# Patient Record
Sex: FEMALE | Race: WHITE | NOT HISPANIC OR LATINO | Employment: OTHER | URBAN - METROPOLITAN AREA
[De-identification: names, ages, dates, MRNs, and addresses within clinical notes are randomized per-mention and may not be internally consistent; named-entity substitution may affect disease eponyms.]

---

## 2022-02-19 PROBLEM — Z80.3 FAMILY HISTORY OF BREAST CANCER: Status: ACTIVE | Noted: 2022-02-19

## 2022-02-19 PROBLEM — Z80.0 FAMILY HISTORY OF COLON CANCER: Status: ACTIVE | Noted: 2022-02-19

## 2022-02-19 PROBLEM — E04.1 THYROID NODULE: Status: ACTIVE | Noted: 2022-02-19

## 2022-02-21 NOTE — PROGRESS NOTES
Assessment/Plan:    Encounter for gynecological examination (general) (routine) without abnormal findings  Doing well, recovering from right knee replacement  No breast or gyn complaints  Normal breast exam, mammo order given  Normal pelvic exam s/p hysterectomy  Last colonoscopy 2013    Macrobid rx sent for UTI that typically occurs in August     Thyroid nodule  Exam WNL, ultrasound follow up order given  Diagnoses and all orders for this visit:    Encounter for gynecological examination (general) (routine) without abnormal findings    Encounter for screening mammogram for malignant neoplasm of breast  -     Mammo screening bilateral w 3d & cad; Future    Dysuria  -     nitrofurantoin (MACROBID) 100 mg capsule; Take 1 capsule (100 mg total) by mouth 2 (two) times a day    Thyroid nodule  -     US head neck soft tissue; Future    Other orders  -     levothyroxine 50 mcg tablet; Take 50 mcg by mouth daily  -     Cobalamin Combinations (B-12) 100-5000 MCG SUBL; Place under the tongue  -     Ascorbic Acid (vitamin C) 1000 MG tablet; Take 1,000 mg by mouth daily  -     Cholecalciferol (Vitamin D) 125 MCG (5000 UT) CAPS; Take by mouth          Subjective:      Patient ID: Tere Robertson is a 79 y o  female  Here for Medicare biannual breast and pelvic exams  The following portions of the patient's history were reviewed and updated as appropriate:   She  has a past medical history of Colon polyps, Difficult airway, Diverticulosis, Fibrocystic breast, Hammer toe, Hypothyroid, Lo neuroma, Osteoarthritis, and Thyroid nodule (03/2021)    She   Patient Active Problem List    Diagnosis Date Noted    Encounter for gynecological examination (general) (routine) without abnormal findings 02/22/2022    Family history of breast cancer - Mother @32 02/19/2022    Family history of colon cancer - Mother @82 02/19/2022    Thyroid nodule 02/19/2022    History of resection of large bowel 2012    History of hysterectomy 1970     She  has a past surgical history that includes Mammo (historical) (11/19/2020); Colonoscopy (2013); DXA procedure(historical) (2015); Hysterectomy (1970); Appendectomy (1980); Cholecystectomy (1980); ARTHROSCOPY KNEE (2009); Bowel resection (2012); Tonsillectomy; Carpal tunnel release (2007); Rotator cuff repair (Left, 2004); Mohs surgery (2000); Knee cartilage surgery (Bilateral); Foot surgery (04/2021); and Knee surgery (11/2021)  Her family history is not on file  She  reports that she has never smoked  She has never used smokeless tobacco  She reports that she does not drink alcohol and does not use drugs  Current Outpatient Medications   Medication Sig Dispense Refill    Ascorbic Acid (vitamin C) 1000 MG tablet Take 1,000 mg by mouth daily      Cholecalciferol (Vitamin D) 125 MCG (5000 UT) CAPS Take by mouth      Cobalamin Combinations (B-12) 100-5000 MCG SUBL Place under the tongue      levothyroxine 50 mcg tablet Take 50 mcg by mouth daily      nitrofurantoin (MACROBID) 100 mg capsule Take 1 capsule (100 mg total) by mouth 2 (two) times a day 14 capsule 0     No current facility-administered medications for this visit  She is allergic to epinephrine, iodinated diagnostic agents, oxycodone, sulfa antibiotics, and other       Review of Systems  No breast, bladder, bowel changes  No new persistent pain, bloating, early satiety or pelvic pressure    Objective:      /54 (BP Location: Left arm, Patient Position: Sitting, Cuff Size: Standard)   Ht 5' 2 75" (1 594 m)   Wt 78 kg (172 lb)   BMI 30 71 kg/m²          Physical Exam  General appearance: no distress, pleasant  Neck: thyroid without nodules or thyromegaly, slightly prominent on right, no palpable adenopathy  Lymph nodes: no palpable adenopathy  Breasts: no masses, nodes or skin changes   Dense bilateral bases  Abdomen: soft, non tender, no palpable masses  Pelvic exam: normal atrophic external genitalia, urethral meatus normal, vagina atrophic without lesions, cuff intact, no adnexal masses, non tender  Rectal exam: deferred per request due to hemorrhoids

## 2022-02-22 ENCOUNTER — ANNUAL EXAM (OUTPATIENT)
Dept: OBGYN CLINIC | Facility: CLINIC | Age: 82
End: 2022-02-22
Payer: MEDICARE

## 2022-02-22 VITALS
HEIGHT: 63 IN | BODY MASS INDEX: 30.48 KG/M2 | WEIGHT: 172 LBS | DIASTOLIC BLOOD PRESSURE: 54 MMHG | SYSTOLIC BLOOD PRESSURE: 100 MMHG

## 2022-02-22 DIAGNOSIS — E04.1 THYROID NODULE: ICD-10-CM

## 2022-02-22 DIAGNOSIS — Z12.31 ENCOUNTER FOR SCREENING MAMMOGRAM FOR MALIGNANT NEOPLASM OF BREAST: ICD-10-CM

## 2022-02-22 DIAGNOSIS — R30.0 DYSURIA: ICD-10-CM

## 2022-02-22 DIAGNOSIS — Z01.419 ENCOUNTER FOR GYNECOLOGICAL EXAMINATION (GENERAL) (ROUTINE) WITHOUT ABNORMAL FINDINGS: Primary | ICD-10-CM

## 2022-02-22 PROCEDURE — G0101 CA SCREEN;PELVIC/BREAST EXAM: HCPCS | Performed by: OBSTETRICS & GYNECOLOGY

## 2022-02-22 RX ORDER — LEVOTHYROXINE SODIUM 0.05 MG/1
50 TABLET ORAL DAILY
COMMUNITY

## 2022-02-22 RX ORDER — NITROFURANTOIN 25; 75 MG/1; MG/1
100 CAPSULE ORAL 2 TIMES DAILY
Qty: 14 CAPSULE | Refills: 0 | Status: SHIPPED | OUTPATIENT
Start: 2022-02-22

## 2022-02-22 RX ORDER — AMOXICILLIN 250 MG
CAPSULE ORAL
COMMUNITY

## 2022-02-22 RX ORDER — CHOLECALCIFEROL (VITAMIN D3) 125 MCG
CAPSULE ORAL
COMMUNITY

## 2022-02-22 RX ORDER — MULTIVIT WITH MINERALS/LUTEIN
1000 TABLET ORAL DAILY
COMMUNITY

## 2022-02-22 NOTE — ASSESSMENT & PLAN NOTE
Doing well, recovering from right knee replacement  No breast or gyn complaints  Normal breast exam, mammo order given  Normal pelvic exam s/p hysterectomy     Last colonoscopy 2013    Macrobid rx sent for UTI that typically occurs in August

## 2022-02-22 NOTE — LETTER
February 22, 2022     Maliha Medeiros MD  [id number] [facility] [address]    Patient: Gianluca Mooney   YOB: 1940   Date of Visit: 2/22/2022       Dear Dr Ivone Rosales: Thank you for referring Gianluca Mooney to me for evaluation  Below are my notes for this consultation  If you have questions, please do not hesitate to call me  I look forward to following your patient along with you  Sincerely,        Scott Chau MD        CC: No Recipients  Scott Chau MD  2/22/2022 11:32 AM  Sign when Signing Visit  Assessment/Plan:    Encounter for gynecological examination (general) (routine) without abnormal findings  Doing well, recovering from right knee replacement  No breast or gyn complaints  Normal breast exam, mammo order given  Normal pelvic exam s/p hysterectomy  Last colonoscopy 2013    Macrobid rx sent for UTI that typically occurs in August     Thyroid nodule  Exam WNL, ultrasound follow up order given  Diagnoses and all orders for this visit:    Encounter for gynecological examination (general) (routine) without abnormal findings    Encounter for screening mammogram for malignant neoplasm of breast  -     Mammo screening bilateral w 3d & cad; Future    Dysuria  -     nitrofurantoin (MACROBID) 100 mg capsule; Take 1 capsule (100 mg total) by mouth 2 (two) times a day    Thyroid nodule  -     US head neck soft tissue; Future    Other orders  -     levothyroxine 50 mcg tablet; Take 50 mcg by mouth daily  -     Cobalamin Combinations (B-12) 100-5000 MCG SUBL; Place under the tongue  -     Ascorbic Acid (vitamin C) 1000 MG tablet; Take 1,000 mg by mouth daily  -     Cholecalciferol (Vitamin D) 125 MCG (5000 UT) CAPS; Take by mouth          Subjective:      Patient ID: Gianluca Mooney is a 79 y o  female  Here for Medicare biannual breast and pelvic exams         The following portions of the patient's history were reviewed and updated as appropriate:   She  has a past medical history of Colon polyps, Difficult airway, Diverticulosis, Fibrocystic breast, Hammer toe, Hypothyroid, Lo neuroma, Osteoarthritis, and Thyroid nodule (03/2021)  She   Patient Active Problem List    Diagnosis Date Noted    Encounter for gynecological examination (general) (routine) without abnormal findings 02/22/2022    Family history of breast cancer - Mother @78 02/19/2022    Family history of colon cancer - Mother @82 02/19/2022    Thyroid nodule 02/19/2022    History of resection of large bowel 2012    History of hysterectomy 1970     She  has a past surgical history that includes Mammo (historical) (11/19/2020); Colonoscopy (2013); DXA procedure(historical) (2015); Hysterectomy (1970); Appendectomy (1980); Cholecystectomy (1980); ARTHROSCOPY KNEE (2009); Bowel resection (2012); Tonsillectomy; Carpal tunnel release (2007); Rotator cuff repair (Left, 2004); Mohs surgery (2000); Knee cartilage surgery (Bilateral); Foot surgery (04/2021); and Knee surgery (11/2021)  Her family history is not on file  She  reports that she has never smoked  She has never used smokeless tobacco  She reports that she does not drink alcohol and does not use drugs  Current Outpatient Medications   Medication Sig Dispense Refill    Ascorbic Acid (vitamin C) 1000 MG tablet Take 1,000 mg by mouth daily      Cholecalciferol (Vitamin D) 125 MCG (5000 UT) CAPS Take by mouth      Cobalamin Combinations (B-12) 100-5000 MCG SUBL Place under the tongue      levothyroxine 50 mcg tablet Take 50 mcg by mouth daily      nitrofurantoin (MACROBID) 100 mg capsule Take 1 capsule (100 mg total) by mouth 2 (two) times a day 14 capsule 0     No current facility-administered medications for this visit  She is allergic to epinephrine, iodinated diagnostic agents, oxycodone, sulfa antibiotics, and other       Review of Systems  No breast, bladder, bowel changes   No new persistent pain, bloating, early satiety or pelvic pressure    Objective:      /54 (BP Location: Left arm, Patient Position: Sitting, Cuff Size: Standard)   Ht 5' 2 75" (1 594 m)   Wt 78 kg (172 lb)   BMI 30 71 kg/m²          Physical Exam  General appearance: no distress, pleasant  Neck: thyroid without nodules or thyromegaly, slightly prominent on right, no palpable adenopathy  Lymph nodes: no palpable adenopathy  Breasts: no masses, nodes or skin changes   Dense bilateral bases  Abdomen: soft, non tender, no palpable masses  Pelvic exam: normal atrophic external genitalia, urethral meatus normal, vagina atrophic without lesions, cuff intact, no adnexal masses, non tender  Rectal exam: deferred per request due to hemorrhoids

## 2022-08-22 ENCOUNTER — TELEPHONE (OUTPATIENT)
Dept: OBGYN CLINIC | Facility: CLINIC | Age: 82
End: 2022-08-22

## 2022-08-22 NOTE — TELEPHONE ENCOUNTER
Spoke with Jason Reddy regarding her U/S results of there thyroid  Went over results, and recommendation for u/s in a year  Jason Reddy aware and agrees with plan of care

## 2022-08-22 NOTE — TELEPHONE ENCOUNTER
Please inform of thyroid u/s results with two small nodules, not suspicious  The left nodule was 6 mm, now 9 mm  Recommend repeating the u/s in one year    Thanks

## 2022-12-13 DIAGNOSIS — Z12.31 ENCOUNTER FOR SCREENING MAMMOGRAM FOR MALIGNANT NEOPLASM OF BREAST: ICD-10-CM

## 2022-12-16 ENCOUNTER — TELEPHONE (OUTPATIENT)
Dept: OBGYN CLINIC | Facility: CLINIC | Age: 82
End: 2022-12-16

## 2022-12-16 NOTE — TELEPHONE ENCOUNTER
Pt called for mammogram results  Pt informed of normal imaging as noted on 12/13/22 by Dr Weiss Gins

## 2023-02-22 NOTE — PROGRESS NOTES
Assessment/Plan:    Family history of colon cancer - Mother @80  Notes some constipation with hemorrhoid/fissure  H/o bowel resection, has been screening with cologuard testing with PCP, four years ago per pt  Orders given for repeat cologuard  Thyroid nodule  Reviewed 2022 imaging with stable, 9 mm low risk nodules  Reviewed recs to not repeat imaging, requests follow up one more year  Normal thyroid exam, u/s orders given  Family history of breast cancer - Mother @80  No change in self or clinical breast exams  Mammo order given, last 11/21/22  Normal pelvic exam    Dexa orders given, last 2015 WNL  Will contact with results  Diagnoses and all orders for this visit:    Family history of breast cancer - Mother @80    History of hysterectomy    Thyroid nodule  -     US head neck soft tissue; Future    Colon cancer screening  -     Cologuard    Osteoporosis screening  -     DXA bone density spine hip and pelvis; Future    Asymptomatic menopausal state  -     DXA bone density spine hip and pelvis; Future    Encounter for screening mammogram for malignant neoplasm of breast  -     Mammo screening bilateral w 3d & cad; Future    Family history of colon cancer - Mother @80          Subjective:      Patient ID: Valeri Goltz is a 79 y o  female  HPI Here for annual check  Concerned re: thyroid nodules    The following portions of the patient's history were reviewed and updated as appropriate:   She  has a past medical history of Colon polyps, Difficult airway, Diverticulosis, Fibrocystic breast, Hammer toe, Hypothyroid, Lo neuroma, Osteoarthritis, and Thyroid nodule (03/2021)    She   Patient Active Problem List    Diagnosis Date Noted   • Encounter for gynecological examination (general) (routine) without abnormal findings 02/22/2022   • Family history of breast cancer - Mother @82 02/19/2022   • Family history of colon cancer - Mother @82 02/19/2022   • Thyroid nodule 02/19/2022   • History of resection of large bowel 2012   • History of hysterectomy 1970     She  has a past surgical history that includes Mammo (historical) (11/19/2020); Colonoscopy (2013); DXA procedure(historical) (2015); Hysterectomy (1970); Appendectomy (1980); Cholecystectomy (1980); ARTHROSCOPY KNEE (2009); Bowel resection (2012); Tonsillectomy; Carpal tunnel release (2007); Rotator cuff repair (Left, 2004); Mohs surgery (2000); Knee cartilage surgery (Bilateral); Foot surgery (04/2021); and Knee surgery (11/2021)  Her family history is not on file  She  reports that she has never smoked  She has never used smokeless tobacco  She reports that she does not currently use alcohol  She reports that she does not use drugs  Current Outpatient Medications   Medication Sig Dispense Refill   • Ascorbic Acid (vitamin C) 1000 MG tablet Take 1,000 mg by mouth daily     • Cholecalciferol (Vitamin D) 125 MCG (5000 UT) CAPS Take by mouth     • Cobalamin Combinations (B-12) 100-5000 MCG SUBL Place under the tongue     • levothyroxine 50 mcg tablet Take 50 mcg by mouth daily     • nitrofurantoin (MACROBID) 100 mg capsule Take 1 capsule (100 mg total) by mouth 2 (two) times a day 14 capsule 0     No current facility-administered medications for this visit  She is allergic to epinephrine, iodinated contrast media, oxycodone, sulfa antibiotics, and other       Review of Systems  No PMB, breast, bladder, bowel changes  No new persistent pain, bloating, early satiety or pelvic pressure      Objective:      /68 (BP Location: Left arm, Patient Position: Sitting, Cuff Size: Standard)   Ht 5' 4" (1 626 m)   Wt 78 7 kg (173 lb 6 4 oz)   Breastfeeding No   BMI 29 76 kg/m²          Physical Exam    General appearance: no distress, pleasant  Neck: thyroid without nodules or thyromegaly, slightly prominent on right, no palpable adenopathy  Lymph nodes: no palpable adenopathy  Breasts: no masses, nodes or skin changes   Dense bilateral bases  Abdomen: soft, non tender, no palpable masses  Pelvic exam: normal atrophic external genitalia, urethral meatus normal, vagina atrophic without lesions, cuff intact, no adnexal masses, non tender  Rectal exam: deferred per request due to hemorrhoids

## 2023-02-24 ENCOUNTER — OFFICE VISIT (OUTPATIENT)
Dept: OBGYN CLINIC | Facility: CLINIC | Age: 83
End: 2023-02-24

## 2023-02-24 VITALS
HEIGHT: 64 IN | BODY MASS INDEX: 29.6 KG/M2 | DIASTOLIC BLOOD PRESSURE: 68 MMHG | WEIGHT: 173.4 LBS | SYSTOLIC BLOOD PRESSURE: 118 MMHG

## 2023-02-24 DIAGNOSIS — Z90.710 HISTORY OF HYSTERECTOMY: ICD-10-CM

## 2023-02-24 DIAGNOSIS — Z80.3 FAMILY HISTORY OF BREAST CANCER: Primary | ICD-10-CM

## 2023-02-24 DIAGNOSIS — Z12.11 COLON CANCER SCREENING: ICD-10-CM

## 2023-02-24 DIAGNOSIS — Z80.0 FAMILY HISTORY OF COLON CANCER: ICD-10-CM

## 2023-02-24 DIAGNOSIS — Z12.31 ENCOUNTER FOR SCREENING MAMMOGRAM FOR MALIGNANT NEOPLASM OF BREAST: ICD-10-CM

## 2023-02-24 DIAGNOSIS — Z78.0 ASYMPTOMATIC MENOPAUSAL STATE: ICD-10-CM

## 2023-02-24 DIAGNOSIS — E04.1 THYROID NODULE: ICD-10-CM

## 2023-02-24 DIAGNOSIS — Z13.820 OSTEOPOROSIS SCREENING: ICD-10-CM

## 2023-02-24 NOTE — LETTER
February 24, 2023     GERALDINE Altman 79  [facility]    Patient: Gurpreet Anderson   YOB: 1940   Date of Visit: 2/24/2023       Dear Dr Wes Chung: Thank you for referring Gurpreet Anderson to me for evaluation  Below are my notes for this consultation  If you have questions, please do not hesitate to call me  I look forward to following your patient along with you  Sincerely,        Hannah Bourgeois MD        CC: No Recipients  Hannah Bourgeois MD  2/24/2023 11:29 AM  Sign when Signing Visit  Assessment/Plan:    Family history of colon cancer - Mother @80  Notes some constipation with hemorrhoid/fissure  H/o bowel resection, has been screening with cologuard testing with PCP, four years ago per pt  Orders given for repeat cologuard  Thyroid nodule  Reviewed 2022 imaging with stable, 9 mm low risk nodules  Reviewed recs to not repeat imaging, requests follow up one more year  Normal thyroid exam, u/s orders given  Family history of breast cancer - Mother @80  No change in self or clinical breast exams  Mammo order given, last 11/21/22  Normal pelvic exam    Dexa orders given, last 2015 WNL  Will contact with results  Diagnoses and all orders for this visit:    Family history of breast cancer - Mother @80    History of hysterectomy    Thyroid nodule  -     US head neck soft tissue; Future    Colon cancer screening  -     Cologuard    Osteoporosis screening  -     DXA bone density spine hip and pelvis; Future    Asymptomatic menopausal state  -     DXA bone density spine hip and pelvis; Future    Encounter for screening mammogram for malignant neoplasm of breast  -     Mammo screening bilateral w 3d & cad; Future    Family history of colon cancer - Mother @80         Subjective:     Patient ID: Gurpreet Anderson is a 79 y o  female  HPI Here for annual check   Concerned re: thyroid nodules    The following portions of the patient's history were reviewed and updated as appropriate:   She  has a past medical history of Colon polyps, Difficult airway, Diverticulosis, Fibrocystic breast, Hammer toe, Hypothyroid, Lo neuroma, Osteoarthritis, and Thyroid nodule (03/2021)  She   Patient Active Problem List    Diagnosis Date Noted   • Encounter for gynecological examination (general) (routine) without abnormal findings 02/22/2022   • Family history of breast cancer - Mother @82 02/19/2022   • Family history of colon cancer - Mother @82 02/19/2022   • Thyroid nodule 02/19/2022   • History of resection of large bowel 2012   • History of hysterectomy 1970     She  has a past surgical history that includes Mammo (historical) (11/19/2020); Colonoscopy (2013); DXA procedure(historical) (2015); Hysterectomy (1970); Appendectomy (1980); Cholecystectomy (1980); ARTHROSCOPY KNEE (2009); Bowel resection (2012); Tonsillectomy; Carpal tunnel release (2007); Rotator cuff repair (Left, 2004); Mohs surgery (2000); Knee cartilage surgery (Bilateral); Foot surgery (04/2021); and Knee surgery (11/2021)  Her family history is not on file  She  reports that she has never smoked  She has never used smokeless tobacco  She reports that she does not currently use alcohol  She reports that she does not use drugs  Current Outpatient Medications   Medication Sig Dispense Refill   • Ascorbic Acid (vitamin C) 1000 MG tablet Take 1,000 mg by mouth daily     • Cholecalciferol (Vitamin D) 125 MCG (5000 UT) CAPS Take by mouth     • Cobalamin Combinations (B-12) 100-5000 MCG SUBL Place under the tongue     • levothyroxine 50 mcg tablet Take 50 mcg by mouth daily     • nitrofurantoin (MACROBID) 100 mg capsule Take 1 capsule (100 mg total) by mouth 2 (two) times a day 14 capsule 0     No current facility-administered medications for this visit  She is allergic to epinephrine, iodinated contrast media, oxycodone, sulfa antibiotics, and other       Review of Systems No PMB, breast, bladder, bowel changes   No new persistent pain, bloating, early satiety or pelvic pressure      Objective:      /68 (BP Location: Left arm, Patient Position: Sitting, Cuff Size: Standard)   Ht 5' 4" (1 626 m)   Wt 78 7 kg (173 lb 6 4 oz)   Breastfeeding No   BMI 29 76 kg/m²         Physical Exam   General appearance: no distress, pleasant  Neck: thyroid without nodules or thyromegaly, slightly prominent on right, no palpable adenopathy  Lymph nodes: no palpable adenopathy  Breasts: no masses, nodes or skin changes   Dense bilateral bases  Abdomen: soft, non tender, no palpable masses  Pelvic exam: normal atrophic external genitalia, urethral meatus normal, vagina atrophic without lesions, cuff intact, no adnexal masses, non tender  Rectal exam: deferred per request due to hemorrhoids

## 2023-02-24 NOTE — PATIENT INSTRUCTIONS
Return to office in one year unless having any problems such as breast changes, bleeding, new persistent pain, new progressive bloating, new problems eating (getting full to quickly) or new constant urinary pressure that does not resolve in one week  Call in six months to schedule your annual visit  Please call the office if you do not hear about your results in 10-14 days

## 2023-02-24 NOTE — ASSESSMENT & PLAN NOTE
Reviewed 2022 imaging with stable, 9 mm low risk nodules  Reviewed recs to not repeat imaging, requests follow up one more year  Normal thyroid exam, u/s orders given

## 2023-02-24 NOTE — ASSESSMENT & PLAN NOTE
Notes some constipation with hemorrhoid/fissure  H/o bowel resection, has been screening with cologuard testing with PCP, four years ago per pt  Orders given for repeat cologuard

## 2023-02-24 NOTE — ASSESSMENT & PLAN NOTE
No change in self or clinical breast exams  Mammo order given, last 11/21/22  Normal pelvic exam    Dexa orders given, last 2015 WNL  Will contact with results

## 2023-10-19 ENCOUNTER — TELEPHONE (OUTPATIENT)
Dept: OBGYN CLINIC | Facility: CLINIC | Age: 83
End: 2023-10-19

## 2023-10-19 DIAGNOSIS — R30.0 DYSURIA: Primary | ICD-10-CM

## 2023-10-19 RX ORDER — NITROFURANTOIN 25; 75 MG/1; MG/1
100 CAPSULE ORAL 2 TIMES DAILY
Qty: 14 CAPSULE | Refills: 0 | Status: SHIPPED | OUTPATIENT
Start: 2023-10-19

## 2023-10-19 RX ORDER — FLUCONAZOLE 150 MG/1
TABLET ORAL
Qty: 2 TABLET | Refills: 0 | Status: SHIPPED | OUTPATIENT
Start: 2023-10-19 | End: 2023-10-23

## 2023-10-19 NOTE — TELEPHONE ENCOUNTER
Mekhi Anton called back requesting rx for Diflucan. She states she always gets a yeast infection as soon as she starts antibiotic. States Dr. Marybel Garcia is aware.  Advised will forward her request to provider

## 2023-10-19 NOTE — TELEPHONE ENCOUNTER
Monique Michael is having urinary frequency, urgency & dysuria. Denies any bleeding or vaginal discharge. Monique Michael uses [facility] for lab testing in [facility]. Sent message to Dr. Cindy Sultana for any suggestions.

## 2023-10-19 NOTE — TELEPHONE ENCOUNTER
Reviewed result and recommendation with shalonda who is in agreement to plan. She will  orders in [place] office today.

## 2023-10-19 NOTE — TELEPHONE ENCOUNTER
U/a, C&S orders placed, look on nursing printer please. Rx Macrobid sent to pharmacy. Thanks. no ROM deficits were identified

## 2023-10-20 ENCOUNTER — TELEPHONE (OUTPATIENT)
Dept: OBGYN CLINIC | Facility: CLINIC | Age: 83
End: 2023-10-20

## 2023-10-20 DIAGNOSIS — N39.0 UTI (URINARY TRACT INFECTION) DUE TO ENTEROCOCCUS: Primary | ICD-10-CM

## 2023-10-20 DIAGNOSIS — B95.2 UTI (URINARY TRACT INFECTION) DUE TO ENTEROCOCCUS: Primary | ICD-10-CM

## 2023-10-20 NOTE — TELEPHONE ENCOUNTER
Patient left v/m inquiring about the use of Macrobid as she was diagnosed stage 3 kidney disease. She is wondering if she could take another medication that would be easier on the kidneys. Her cell number she can be best reached at is [phone]. Dr. Anthony Freitas, please advise.

## 2023-10-21 RX ORDER — CIPROFLOXACIN 500 MG/1
500 TABLET, FILM COATED ORAL EVERY 12 HOURS SCHEDULED
Qty: 10 TABLET | Refills: 0 | Status: SHIPPED | OUTPATIENT
Start: 2023-10-21 | End: 2023-10-26

## 2023-10-21 NOTE — TELEPHONE ENCOUNTER
Message received this AM. Called pt, left message on cell to use Cipro, not Macrobid. UC with E aerogenes UTI, sensitive to Cipro. 500 mg BID x 5 days rx sent. Calculated crcl 42.9 ml/min based on [facility] 9/11/23 serum creat of .99; ht of 5'4" and weight 173 lb using  Cockcroft & Gault using Adjusted BW. Cipro dose based on CrCl 30 to 50 recommended dosing.

## 2023-10-23 ENCOUNTER — TELEPHONE (OUTPATIENT)
Dept: OBGYN CLINIC | Facility: CLINIC | Age: 83
End: 2023-10-23

## 2023-10-23 NOTE — TELEPHONE ENCOUNTER
Please call pt and insure she received the message I left on Sat for her to switch to Cipro for this UTI.    Thanks

## 2023-10-23 NOTE — TELEPHONE ENCOUNTER
Left v/m for patient to call office back regarding using her antibiotics and recommendations from provider.

## 2023-12-27 ENCOUNTER — TELEPHONE (OUTPATIENT)
Dept: OBGYN CLINIC | Facility: CLINIC | Age: 83
End: 2023-12-27

## 2023-12-27 NOTE — TELEPHONE ENCOUNTER
Spoke with patient and reviewed results and informed her there is no f/u recommendations needed. Pt wishes to schedule annual appointment and was transferred to  to be scheduled.

## 2023-12-27 NOTE — TELEPHONE ENCOUNTER
Please call with thyroid u/s results with subcentimeter nodules as in the past, nonsuspicious, no follow up recommended.   Thanks.

## 2024-01-04 DIAGNOSIS — Z12.31 ENCOUNTER FOR SCREENING MAMMOGRAM FOR MALIGNANT NEOPLASM OF BREAST: ICD-10-CM

## 2024-01-12 ENCOUNTER — TELEPHONE (OUTPATIENT)
Dept: OBGYN CLINIC | Facility: CLINIC | Age: 84
End: 2024-01-12

## 2024-01-12 NOTE — TELEPHONE ENCOUNTER
Please call pt with dexa results - Normal spine and hip. Osteopenia in left fem neck with 8% decline from 2015, still very minimal, T-1.1  Continue to strive for 1200 mg of calcium and 1000 IU of vitamin D daily in diet and supplements combined for your bone health.  You can only absorb 600 mg of calcium at a time. Avoid excess calcium as this may adversely effect your heart.  There are 400 mg of calcium in an 8 oz serving of dairy.  Repeat the test in 5 years.   Thanks.

## 2024-01-12 NOTE — TELEPHONE ENCOUNTER
Spoke with patient to review Dexa results and recommendations. Advised Continue to strive for 1200 mg of calcium and 1000 IU of vitamin D daily in diet and supplements combined. Informed you can only absorb 600 mg of calcium at a time. Avoid excess calcium as this may adversely effect your heart. Provided example informing there is 400 mg of calcium in an 8 oz serving of dairy and will repeat the test in 5 years.  Pt informed she has been diagnosed with Stage 3 Kidney disease and inquiring if Calcium dosage is appropriate. Pt follows her PCP for monitoring of CKD, recommended to discuss with PCP regarding calcium/VIT D intake dosing for clearance.

## 2024-02-21 PROBLEM — Z01.419 ENCOUNTER FOR GYNECOLOGICAL EXAMINATION (GENERAL) (ROUTINE) WITHOUT ABNORMAL FINDINGS: Status: RESOLVED | Noted: 2022-02-22 | Resolved: 2024-02-21

## 2024-02-24 NOTE — PROGRESS NOTES
Assessment/Plan:    Encounter for gynecological examination (general) (routine) without abnormal findings  Here for well check, no breast or gyn complaints.   Had a few UTI's last year, none this year.  Normal breast and pelvic exams s/p hysterectomy  Mammo order given, last 12/1/23  Cologuard 8/2021 neg, due 8/2024  Dexa 12/2023 hip T-1.1  Option reviewed for topical estradiol cream weekly to decrease UTIs. R&B reviewed, rx printed.       Diagnoses and all orders for this visit:    Encounter for gynecological examination (general) (routine) without abnormal findings    Family history of breast cancer in mother  -     Mammo screening bilateral w 3d & cad; Future    Breast cancer screening by mammogram  -     Mammo screening bilateral w 3d & cad; Future    Vaginal atrophy  -     estradiol (ESTRACE VAGINAL) 0.1 mg/g vaginal cream; Pea sized application externally weekly. Do not start before February 29, 2024.    Frequent UTI  -     estradiol (ESTRACE VAGINAL) 0.1 mg/g vaginal cream; Pea sized application externally weekly. Do not start before February 29, 2024.          Subjective:      Patient ID: Zofia Beard is a 83 y.o. female.    HPI Here for Medicare biannual breast and pelvic exams.     The following portions of the patient's history were reviewed and updated as appropriate: She  has a past medical history of Colon cancer screening (08/2021), Colon polyps, Difficult airway, Diverticulosis, Fibrocystic breast, Hammer toe, Hypothyroid, Lo neuroma, Osteoarthritis, and Thyroid nodule (03/2021).  She   Patient Active Problem List    Diagnosis Date Noted    Stage 3 chronic kidney disease (HCC) 02/27/2024    Family history of breast cancer - Mother @82 02/19/2022    Family history of colon cancer - Mother @82 02/19/2022    Thyroid nodule 02/19/2022    History of resection of large bowel 2012    History of hysterectomy 1970     She  has a past surgical history that includes Mammo (historical) (11/19/2020);  "Colonoscopy (2013); DXA procedure(historical) (2015); Hysterectomy (1970); Appendectomy (1980); Cholecystectomy (1980); ARTHROSCOPY KNEE (2009); Bowel resection (2012); Tonsillectomy; Carpal tunnel release (2007); Rotator cuff repair (Left, 2004); Mohs surgery (2000); Knee cartilage surgery (Bilateral); Foot surgery (04/2021); and Knee surgery (11/2021).  Her family history includes Breast cancer in her mother; Cancer in her other; Colon cancer in her mother.  She  reports that she has never smoked. She has never used smokeless tobacco. She reports that she does not currently use alcohol. She reports that she does not use drugs.  Current Outpatient Medications   Medication Sig Dispense Refill    Ascorbic Acid (vitamin C) 1000 MG tablet Take 1,000 mg by mouth daily      Cholecalciferol (Vitamin D) 125 MCG (5000 UT) CAPS Take by mouth      Cobalamin Combinations (B-12) 100-5000 MCG SUBL Place under the tongue      [START ON 2/29/2024] estradiol (ESTRACE VAGINAL) 0.1 mg/g vaginal cream Pea sized application externally weekly. Do not start before February 29, 2024. 42.5 g 1    levothyroxine 50 mcg tablet Take 50 mcg by mouth daily       No current facility-administered medications for this visit.     She is allergic to epinephrine, iodinated contrast media, oxycodone, sulfa antibiotics, and other..    Review of Systems  No breast, bladder, bowel changes. No new persistent pain, bloating, early satiety or pelvic pressure      Objective:      /64 (BP Location: Left arm, Patient Position: Sitting, Cuff Size: Large)   Ht 5' 3\" (1.6 m)   Wt 79.8 kg (176 lb)   BMI 31.18 kg/m²          Physical Exam    General appearance: no distress, pleasant  Neck: thyroid without nodules or thyromegaly, no palpable adenopathy  Lymph nodes: no palpable adenopathy  Breasts: no masses, nodes or skin changes  Abdomen: soft, non tender, no palpable masses, well healed scar  Pelvic exam: normal atrophic external genitalia, urethral " meatus normal, vagina atrophic without lesions, cuff intact, no adnexal masses, non tender  Rectal exam: deferred per request     [Skin Rash] : skin rash [Negative] : Endocrine [Fever] : no fever [Night Sweats] : no night sweats [Chills] : no chills [Fatigue] : no fatigue [Shortness Of Breath] : no shortness of breath [Chest Pain] : no chest pain [Recent Change In Weight] : ~T no recent weight change [Abdominal Pain] : no abdominal pain [Constipation] : no constipation [Cough] : no cough [Easy Bleeding] : no tendency for easy bleeding [Diarrhea] : no diarrhea [de-identified] : scattered skin bumps in the lower leg skin [Easy Bruising] : no tendency for easy bruising

## 2024-02-27 ENCOUNTER — ANNUAL EXAM (OUTPATIENT)
Dept: OBGYN CLINIC | Facility: CLINIC | Age: 84
End: 2024-02-27
Payer: MEDICARE

## 2024-02-27 VITALS
SYSTOLIC BLOOD PRESSURE: 124 MMHG | HEIGHT: 63 IN | BODY MASS INDEX: 31.18 KG/M2 | DIASTOLIC BLOOD PRESSURE: 64 MMHG | WEIGHT: 176 LBS

## 2024-02-27 DIAGNOSIS — Z01.419 ENCOUNTER FOR GYNECOLOGICAL EXAMINATION (GENERAL) (ROUTINE) WITHOUT ABNORMAL FINDINGS: Primary | ICD-10-CM

## 2024-02-27 DIAGNOSIS — N95.2 VAGINAL ATROPHY: ICD-10-CM

## 2024-02-27 DIAGNOSIS — Z80.3 FAMILY HISTORY OF BREAST CANCER IN MOTHER: ICD-10-CM

## 2024-02-27 DIAGNOSIS — N39.0 FREQUENT UTI: ICD-10-CM

## 2024-02-27 DIAGNOSIS — Z12.31 BREAST CANCER SCREENING BY MAMMOGRAM: ICD-10-CM

## 2024-02-27 PROBLEM — N18.30 STAGE 3 CHRONIC KIDNEY DISEASE (HCC): Status: ACTIVE | Noted: 2024-02-27

## 2024-02-27 PROCEDURE — G0101 CA SCREEN;PELVIC/BREAST EXAM: HCPCS | Performed by: OBSTETRICS & GYNECOLOGY

## 2024-02-27 RX ORDER — ESTRADIOL 0.1 MG/G
CREAM VAGINAL
Qty: 42.5 G | Refills: 1 | Status: SHIPPED | OUTPATIENT
Start: 2024-02-29

## 2024-02-27 NOTE — ASSESSMENT & PLAN NOTE
Here for well check, no breast or gyn complaints.   Had a few UTI's last year, none this year.  Normal breast and pelvic exams s/p hysterectomy  Mammo order given, last 12/1/23  Cologuard 8/2021 neg, due 8/2024  Dexa 12/2023 hip T-1.1  Option reviewed for topical estradiol cream weekly to decrease UTIs. R&B reviewed, rx printed.

## 2024-02-27 NOTE — LETTER
February 27, 2024     Nicole Srivastava,   [address] [facility] [address]    Patient: Zofia Beard   YOB: 1940   Date of Visit: 2/27/2024       Dear Dr. Srivastava:    Thank you for referring Zofia Beard to me for evaluation. Below are my notes for this consultation.    If you have questions, please do not hesitate to call me. I look forward to following your patient along with you.         Sincerely,        Ashley Felix MD        CC: No Recipients    Ashley Felix MD  2/27/2024  3:02 PM  Sign when Signing Visit  Assessment/Plan:    Encounter for gynecological examination (general) (routine) without abnormal findings  Here for well check, no breast or gyn complaints.   Had a few UTI's last year, none this year.  Normal breast and pelvic exams s/p hysterectomy  Mammo order given, last 12/1/23  Cologuard 8/2021 neg, due 8/2024  Dexa 12/2023 hip T-1.1  Option reviewed for topical estradiol cream weekly to decrease UTIs. R&B reviewed, rx printed.       Diagnoses and all orders for this visit:    Encounter for gynecological examination (general) (routine) without abnormal findings    Family history of breast cancer in mother  -     Mammo screening bilateral w 3d & cad; Future    Breast cancer screening by mammogram  -     Mammo screening bilateral w 3d & cad; Future    Vaginal atrophy  -     estradiol (ESTRACE VAGINAL) 0.1 mg/g vaginal cream; Pea sized application externally weekly. Do not start before February 29, 2024.    Frequent UTI  -     estradiol (ESTRACE VAGINAL) 0.1 mg/g vaginal cream; Pea sized application externally weekly. Do not start before February 29, 2024.          Subjective:      Patient ID: Zofia Beard is a 83 y.o. female.    HPI Here for Medicare biannual breast and pelvic exams.     The following portions of the patient's history were reviewed and updated as appropriate: She  has a past medical history of Colon cancer screening (08/2021), Colon polyps, Difficult  airway, Diverticulosis, Fibrocystic breast, Hammer toe, Hypothyroid, Lo neuroma, Osteoarthritis, and Thyroid nodule (03/2021).  She   Patient Active Problem List    Diagnosis Date Noted   • Stage 3 chronic kidney disease (HCC) 02/27/2024   • Family history of breast cancer - Mother @82 02/19/2022   • Family history of colon cancer - Mother @82 02/19/2022   • Thyroid nodule 02/19/2022   • History of resection of large bowel 2012   • History of hysterectomy 1970     She  has a past surgical history that includes Mammo (historical) (11/19/2020); Colonoscopy (2013); DXA procedure(historical) (2015); Hysterectomy (1970); Appendectomy (1980); Cholecystectomy (1980); ARTHROSCOPY KNEE (2009); Bowel resection (2012); Tonsillectomy; Carpal tunnel release (2007); Rotator cuff repair (Left, 2004); Mohs surgery (2000); Knee cartilage surgery (Bilateral); Foot surgery (04/2021); and Knee surgery (11/2021).  Her family history includes Breast cancer in her mother; Cancer in her other; Colon cancer in her mother.  She  reports that she has never smoked. She has never used smokeless tobacco. She reports that she does not currently use alcohol. She reports that she does not use drugs.  Current Outpatient Medications   Medication Sig Dispense Refill   • Ascorbic Acid (vitamin C) 1000 MG tablet Take 1,000 mg by mouth daily     • Cholecalciferol (Vitamin D) 125 MCG (5000 UT) CAPS Take by mouth     • Cobalamin Combinations (B-12) 100-5000 MCG SUBL Place under the tongue     • [START ON 2/29/2024] estradiol (ESTRACE VAGINAL) 0.1 mg/g vaginal cream Pea sized application externally weekly. Do not start before February 29, 2024. 42.5 g 1   • levothyroxine 50 mcg tablet Take 50 mcg by mouth daily       No current facility-administered medications for this visit.     She is allergic to epinephrine, iodinated contrast media, oxycodone, sulfa antibiotics, and other..    Review of Systems  No breast, bladder, bowel changes. No new persistent  "pain, bloating, early satiety or pelvic pressure      Objective:      /64 (BP Location: Left arm, Patient Position: Sitting, Cuff Size: Large)   Ht 5' 3\" (1.6 m)   Wt 79.8 kg (176 lb)   BMI 31.18 kg/m²          Physical Exam    General appearance: no distress, pleasant  Neck: thyroid without nodules or thyromegaly, no palpable adenopathy  Lymph nodes: no palpable adenopathy  Breasts: no masses, nodes or skin changes  Abdomen: soft, non tender, no palpable masses, well healed scar  Pelvic exam: normal atrophic external genitalia, urethral meatus normal, vagina atrophic without lesions, cuff intact, no adnexal masses, non tender  Rectal exam: deferred per request    "

## 2024-12-11 ENCOUNTER — OFFICE VISIT (OUTPATIENT)
Age: 84
End: 2024-12-11
Payer: MEDICARE

## 2024-12-11 VITALS
HEIGHT: 65 IN | WEIGHT: 176 LBS | BODY MASS INDEX: 29.32 KG/M2 | SYSTOLIC BLOOD PRESSURE: 144 MMHG | DIASTOLIC BLOOD PRESSURE: 82 MMHG

## 2024-12-11 DIAGNOSIS — Z90.49 HISTORY OF RESECTION OF LARGE BOWEL: ICD-10-CM

## 2024-12-11 DIAGNOSIS — R19.5 POSITIVE COLORECTAL CANCER SCREENING USING COLOGUARD TEST: Primary | ICD-10-CM

## 2024-12-11 DIAGNOSIS — Z80.0 FAMILY HISTORY OF COLON CANCER: ICD-10-CM

## 2024-12-11 DIAGNOSIS — Z12.11 COLON CANCER SCREENING: ICD-10-CM

## 2024-12-11 PROCEDURE — 99204 OFFICE O/P NEW MOD 45 MIN: CPT | Performed by: INTERNAL MEDICINE

## 2024-12-11 PROCEDURE — G2211 COMPLEX E/M VISIT ADD ON: HCPCS | Performed by: INTERNAL MEDICINE

## 2024-12-11 RX ORDER — CYCLOSPORINE 0.5 MG/ML
1 EMULSION OPHTHALMIC 2 TIMES DAILY
COMMUNITY

## 2024-12-11 NOTE — PATIENT INSTRUCTIONS
We will plan a low residue diet for 7 days before the colonoscopy.    Take  MiraLAX 1 dose each morning for those 7 days.    On the morning of the procedure you will take a fleets enema 3 hours before your arrival time and another fleets enema one hour later

## 2024-12-11 NOTE — PROGRESS NOTES
[facility] - Outpatient Consultation  Zofia Beard 84 y.o. female MRN: [id number]  Encounter: [id number]    ASSESSMENT AND PLAN:      1. Positive colorectal cancer screening using Cologuard test (Primary)  2. Colon cancer screening  3. Family history of colon cancer - Mother @82  No polyps on colonoscopy April 2013 with Dr. Canada  Negative Cologuard 2021, positive Cologuard September 2024 shortly after a GI illness with loose stools.  No unintentional weight loss, rectal bleeding or other alarm symptoms.  Discussed workup options.  She agrees to colonoscopy, but she is very concerned about colon prep since she had difficulty in 2013.  Reviewed options and she does not think she would tolerate an oral prep.  Will do a low residue diet and daily MiraLAX for 7 days before the procedure, she will then take 2 fleets enemas the morning of the procedure.    4. History of resection of large bowel  Sigmoidectomy 2012 for chronic diverticulitis    5.  History of difficult intubation  We will arrange procedure at [facility]      Followup Appointment: Pending colonoscopy  ______________________________________________________________________    Chief Complaint   Patient presents with    positive cologaurd     HX of bowel resection       HPI:   Zofia Beard is a 84 y.o. year old female who presents for consultation regarding a prior positive Cologuard in September 2024.  This occurred shortly after a several month bout of looser stools.  Occasionally the stool looked bilious.  For 1 month she had rice with dinner and symptoms resolved in October.  She did not require any antidiarrheals.  She did not see any obvious neto blood.    She had a negative Cologuard in 2021.  Last colonoscopy was in April 2013with Dr. Canada.  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  She had constipation prior to the  resection and stools were normal afterwards.  Following this flare of loose stools were made October, her stools are now normal.  The diarrhea began shortly after her knee surgery in May.  She reports profound difficulty with the bowel prep in 2013, she is concerned about proceeding with colonoscopy for this reason.  Historical Information   Past Medical History:   Diagnosis Date    Chronic kidney disease     3A    Colon cancer screening 08/2021    Due 2024 per [facility] lab    Colon polyps     Difficult airway     Diverticulosis     Fibrocystic breast     Hammer toe     Hypothyroid     Lo neuroma     Osteoarthritis     Thyroid nodule 03/2021    Small, repeat u/s due 3/2022     Past Surgical History:   Procedure Laterality Date    APPENDECTOMY  1980    ARTHROSCOPY KNEE  2009    BOWEL RESECTION  2012    , small bowel resection, colectomy - chronic diverticular changes    CARPAL TUNNEL RELEASE  2007    CHOLECYSTECTOMY  1980    COLONOSCOPY  2013    Due 2018    DXA PROCEDURE (HISTORICAL)  2015    WNL    FOOT SURGERY  04/2021    HYSTERECTOMY  1970    KNEE ARTHROSCOPY W/ SYNOVECTOMY  05/03/2024    KNEE CARTILAGE SURGERY Bilateral     KNEE CARTILAGE SURGERY Right     KNEE SURGERY  11/2021    Right knee replacement    MAMMO (HISTORICAL)  11/19/2020    2B    MOHS SURGERY  2000    ROTATOR CUFF REPAIR Left 2004    TONSILLECTOMY       Social History     Substance and Sexual Activity   Alcohol Use Not Currently     Social History     Substance and Sexual Activity   Drug Use Never     Social History     Tobacco Use   Smoking Status Never   Smokeless Tobacco Never     Family History   Problem Relation Age of Onset    Colon cancer Mother     Breast cancer Mother     Cancer Other         Two maternal aunts    Uterine cancer Neg Hx     Ovarian cancer Neg Hx        Meds/Allergies     Current Outpatient Medications:     Ascorbic Acid (vitamin C) 1000 MG tablet    Cholecalciferol (Vitamin D) 125 MCG (5000 UT) CAPS    Cobalamin  "Combinations (B-12) 100-5000 MCG SUBL    cycloSPORINE (RESTASIS) 0.05 % ophthalmic emulsion    estradiol (ESTRACE VAGINAL) 0.1 mg/g vaginal cream    levothyroxine 50 mcg tablet    Zinc Sulfate (ZINC 15 PO)    Allergies   Allergen Reactions    Epinephrine Tachycardia     palpitations      Iodinated Contrast Media Other (See Comments)    Oxycodone Other (See Comments)     \"all wacky\" \"Can't think\"    Sulfa Antibiotics GI Intolerance     swelling      Other Rash       PHYSICAL EXAM:    Blood pressure 144/82, height 5' 4.75\" (1.645 m), weight 79.8 kg (176 lb), not currently breastfeeding. Body mass index is 29.51 kg/m².  General Appearance: NAD, cooperative, alert  Eyes: Anicteric, conjunctiva pink   ENT:  Normocephalic, atraumatic, normal mucosa.    Neck:  Supple, symmetrical, trachea midline,   Resp:  Clear to auscultation bilaterally; no rales, rhonchi or wheezing; respirations unlabored   CV:  S1 S2, Regular rate and rhythm; no murmur, rub, or gallop.  GI:  Soft, non-tender, non-distended; normal bowel sounds; no masses, no organomegaly   Rectal: Deferred  Musculoskeletal: No cyanosis, clubbing or edema. Normal ROM.  Skin:  No jaundice, rashes, or lesions   Heme/Lymph: No palpable cervical lymphadenopathy  Psych: Normal affect, good eye contact  Neuro: No gross deficits, AAOx3    Lab Results:   No results found for: \"WBC\", \"HGB\", \"HCT\", \"MCV\", \"PLT\"  Lab Results   Component Value Date    K 3.6 09/16/2019     09/16/2019    CO2 26 09/16/2019    BUN 18 09/16/2019    CREATININE 0.92 09/16/2019    CALCIUM 8.3 (L) 09/16/2019    AST 17 09/16/2019    ALT 23 09/16/2019    ALKPHOS 75 09/16/2019    EGFR 59 (L) 09/16/2019         Radiology Results:   No results found.      REVIEW OF SYSTEMS:    CONSTITUTIONAL: Denies any fever, chills, rigors, and weight loss.  HEENT: No earache or tinnitus. Denies hearing loss or visual disturbances.  CARDIOVASCULAR: No chest pain or palpitations.   RESPIRATORY: Denies any cough, " hemoptysis, shortness of breath or dyspnea on exertion.  GASTROINTESTINAL: As noted in the History of Present Illness.   GENITOURINARY: No problems with urination. Denies any hematuria or dysuria.  NEUROLOGIC: No dizziness or vertigo, denies headaches.   MUSCULOSKELETAL: Denies any muscle or joint pain.   SKIN: Denies skin rashes or itching.   ENDOCRINE: Denies excessive thirst. Denies intolerance to heat or cold.  PSYCHOSOCIAL: Denies depression or anxiety. Denies any recent memory loss.

## 2024-12-17 ENCOUNTER — TELEPHONE (OUTPATIENT)
Age: 84
End: 2024-12-17

## 2024-12-17 NOTE — TELEPHONE ENCOUNTER
Scheduled date of colonoscopy (as of today):  Physician performing colonoscopy: Arelis  Location of colonoscopy: [facility]  Bowel prep reviewed with patient: SARANYA/JOSE sent to My chart  Instructions reviewed with patient by: SF  Clearances: N/A    :

## 2025-02-18 ENCOUNTER — TELEPHONE (OUTPATIENT)
Dept: GASTROENTEROLOGY | Facility: CLINIC | Age: 85
End: 2025-02-18

## 2025-02-18 DIAGNOSIS — Z12.11 COLON CANCER SCREENING: ICD-10-CM

## 2025-02-18 DIAGNOSIS — R19.5 POSITIVE COLORECTAL CANCER SCREENING USING COLOGUARD TEST: Primary | ICD-10-CM

## 2025-02-18 DIAGNOSIS — Z80.0 FAMILY HISTORY OF COLON CANCER: ICD-10-CM

## 2025-03-06 ENCOUNTER — TELEPHONE (OUTPATIENT)
Dept: GASTROENTEROLOGY | Facility: CLINIC | Age: 85
End: 2025-03-06

## 2025-03-06 NOTE — TELEPHONE ENCOUNTER
Procedure confirmed  Colonoscopy     Via: Spoke with patient.      Instructions given: Given to Patient at Visit     Prep Given: Special instructions reviewed with pt-see below    Call the office if there are any questions.        Instructions: We will plan a low residue diet for 7 days before the colonoscopy.    Take  MiraLAX 1 dose each morning for those 7 days.     On the morning of the procedure you will take a fleets enema 3 hours before your arrival time and another fleets enema one hour later

## 2025-03-07 NOTE — PROGRESS NOTES
Name: Zofia Beard      : 1940      MRN: [id number]  Encounter Provider: Ashley Felix MD  Encounter Date: 3/11/2025   Encounter department: [facility] [place]  :  Assessment & Plan  Family history of breast cancer - Mother @82  No change in self breast or clinical breast exams.  Mammo order given, last 23 BIRADS 2B; scheduled 3/21/25  1/11/24 dexa with normal spine and hip. Left fem neck 8% decline from 2015, T-1.1  Cologuard abnormal, scheduled for colonoscopy       Encounter for screening mammogram for malignant neoplasm of breast    Orders:    Mammo screening bilateral w 3d and cad; Future    History of Present Illness   HPI  Zofia Beard is a 84 y.o. female who presents for breast exam secondary to family history of breast cancer.     Review of Systems  No breast masses, nipple discharge or nipple bleeding.  No breast, bladder, bowel changes. No new persistent pain      Past Medical History   Past Medical History:   Diagnosis Date    Chronic kidney disease     3A    Colon cancer screening     abnormal, scheduled for colonoscopy    Colon polyps     Difficult airway     Diverticulosis     Fibrocystic breast     Hammer toe     Hypothyroid     Lo neuroma     Osteoarthritis     Thyroid nodule 2021    Small, repeat u/s due 3/2022     Past Surgical History:   Procedure Laterality Date    APPENDECTOMY      ARTHROSCOPY KNEE  2009    BOWEL RESECTION      , small bowel resection, colectomy - chronic diverticular changes    CARPAL TUNNEL RELEASE      CHOLECYSTECTOMY      COLONOSCOPY      Due 2018    FOOT SURGERY  2021    HYSTERECTOMY  1970    KNEE ARTHROSCOPY W/ SYNOVECTOMY  2024    KNEE CARTILAGE SURGERY Bilateral     KNEE SURGERY  2021    Right knee replacement    MOHS SURGERY  2000    ROTATOR CUFF REPAIR Left 2004    TONSILLECTOMY       Family History   Problem Relation Age of Onset    Colon cancer Mother     Breast cancer Mother      "Cancer Other         Two maternal aunts    Uterine cancer Neg Hx     Ovarian cancer Neg Hx       reports that she has never smoked. She has never used smokeless tobacco. She reports that she does not currently use alcohol. She reports that she does not use drugs.  Current Outpatient Medications   Medication Instructions    Cholecalciferol (Vitamin D) 125 MCG (5000 UT) CAPS Take by mouth    Cobalamin Combinations (B-12) 100-5000 MCG SUBL Place under the tongue    cycloSPORINE (RESTASIS) 0.05 % ophthalmic emulsion 1 drop, 2 times daily    estradiol (ESTRACE VAGINAL) 0.1 mg/g vaginal cream Pea sized application externally weekly.    levothyroxine 50 mcg, Daily    vitamin C 1,000 mg, Daily    Zinc Sulfate (ZINC 15 PO) Take by mouth     Allergies   Allergen Reactions    Epinephrine Tachycardia     palpitations      Iodinated Contrast Media Other (See Comments)    Nitrofurantoin Diarrhea    Oxycodone Other (See Comments)     \"all wacky\" \"Can't think\"    Sulfa Antibiotics GI Intolerance     swelling      Other Rash         Objective   /66 (BP Location: Left arm, Patient Position: Sitting, Cuff Size: Large)   Ht 5' 2.75\" (1.594 m)   Wt 79.3 kg (174 lb 12.8 oz)   BMI 31.21 kg/m²      Physical Exam  Appears well, no apparent distress.   Does not appear anxious or depressed.  Neck: thyroid normal size without nodules, no palpable adenopathy  Breasts: no masses, nodes, skin changes  Abdomen: soft, non tender, non tender liver edge        "

## 2025-03-07 NOTE — ASSESSMENT & PLAN NOTE
No change in self breast or clinical breast exams.  Mammo order given, last 12/1/23 BIRADS 2B; scheduled 3/21/25  1/11/24 dexa with normal spine and hip. Left fem neck 8% decline from 2015, T-1.1  Cologuard abnormal, scheduled for colonoscopy

## 2025-03-11 ENCOUNTER — ANNUAL EXAM (OUTPATIENT)
Dept: OBGYN CLINIC | Facility: CLINIC | Age: 85
End: 2025-03-11
Payer: MEDICARE

## 2025-03-11 VITALS
HEIGHT: 63 IN | WEIGHT: 174.8 LBS | SYSTOLIC BLOOD PRESSURE: 132 MMHG | BODY MASS INDEX: 30.97 KG/M2 | DIASTOLIC BLOOD PRESSURE: 66 MMHG

## 2025-03-11 DIAGNOSIS — Z80.3 FAMILY HISTORY OF BREAST CANCER: Primary | ICD-10-CM

## 2025-03-11 DIAGNOSIS — Z12.31 ENCOUNTER FOR SCREENING MAMMOGRAM FOR MALIGNANT NEOPLASM OF BREAST: ICD-10-CM

## 2025-03-11 PROCEDURE — 99213 OFFICE O/P EST LOW 20 MIN: CPT | Performed by: OBSTETRICS & GYNECOLOGY

## 2025-03-11 NOTE — LETTER
2025     Nicole Srivastava, DO  [address] [facility] [address]    Patient: Zofia Beard   YOB: 1940   Date of Visit: 3/11/2025       Dear Dr. Srivastava:    Zofia Beard was in to see me today for her annual breast exam secondary to family history of breast cancer in her mother. Below are my notes for this visit.    If you have questions, please do not hesitate to call me. I look forward to following your patient along with you.         Sincerely,        Ashley Felix MD        CC: No Recipients    Ashley Felix MD  3/11/2025  3:11 PM  Sign when Signing Visit  Name: Zofia Beard      : 1940      MRN: [id number]  Encounter Provider: Ashley Felix MD  Encounter Date: 3/11/2025   Encounter department: [facility] [place]  :  Assessment & Plan  Family history of breast cancer - Mother @82  No change in self breast or clinical breast exams.  Mammo order given, last 23 BIRADS 2B; scheduled 3/21/25  1/11/24 dexa with normal spine and hip. Left fem neck 8% decline from 2015, T-1.1  Cologuard abnormal, scheduled for colonoscopy       Encounter for screening mammogram for malignant neoplasm of breast    Orders:  •  Mammo screening bilateral w 3d and cad; Future    History of Present Illness  HPI  Zofia Beard is a 84 y.o. female who presents for breast exam secondary to family history of breast cancer.     Review of Systems  No breast masses, nipple discharge or nipple bleeding.  No breast, bladder, bowel changes. No new persistent pain      Past Medical History  Past Medical History:   Diagnosis Date   • Chronic kidney disease     3A   • Colon cancer screening     abnormal, scheduled for colonoscopy   • Colon polyps    • Difficult airway    • Diverticulosis    • Fibrocystic breast    • Hammer toe    • Hypothyroid    • Lo neuroma    • Osteoarthritis    • Thyroid nodule 2021    Small, repeat u/s due 3/2022     Past Surgical History:  "  Procedure Laterality Date   • APPENDECTOMY  1980   • ARTHROSCOPY KNEE  2009   • BOWEL RESECTION  2012    , small bowel resection, colectomy - chronic diverticular changes   • CARPAL TUNNEL RELEASE  2007   • CHOLECYSTECTOMY  1980   • COLONOSCOPY  2013    Due 2018   • FOOT SURGERY  04/2021   • HYSTERECTOMY  1970   • KNEE ARTHROSCOPY W/ SYNOVECTOMY  05/03/2024   • KNEE CARTILAGE SURGERY Bilateral    • KNEE SURGERY  11/2021    Right knee replacement   • MOHS SURGERY  2000   • ROTATOR CUFF REPAIR Left 2004   • TONSILLECTOMY       Family History   Problem Relation Age of Onset   • Colon cancer Mother    • Breast cancer Mother    • Cancer Other         Two maternal aunts   • Uterine cancer Neg Hx    • Ovarian cancer Neg Hx       reports that she has never smoked. She has never used smokeless tobacco. She reports that she does not currently use alcohol. She reports that she does not use drugs.  Current Outpatient Medications   Medication Instructions   • Cholecalciferol (Vitamin D) 125 MCG (5000 UT) CAPS Take by mouth   • Cobalamin Combinations (B-12) 100-5000 MCG SUBL Place under the tongue   • cycloSPORINE (RESTASIS) 0.05 % ophthalmic emulsion 1 drop, 2 times daily   • estradiol (ESTRACE VAGINAL) 0.1 mg/g vaginal cream Pea sized application externally weekly.   • levothyroxine 50 mcg, Daily   • vitamin C 1,000 mg, Daily   • Zinc Sulfate (ZINC 15 PO) Take by mouth     Allergies   Allergen Reactions   • Epinephrine Tachycardia     palpitations     • Iodinated Contrast Media Other (See Comments)   • Nitrofurantoin Diarrhea   • Oxycodone Other (See Comments)     \"all wacky\" \"Can't think\"   • Sulfa Antibiotics GI Intolerance     swelling     • Other Rash         Objective  /66 (BP Location: Left arm, Patient Position: Sitting, Cuff Size: Large)   Ht 5' 2.75\" (1.594 m)   Wt 79.3 kg (174 lb 12.8 oz)   BMI 31.21 kg/m²      Physical Exam  Appears well, no apparent distress.   Does not appear anxious or depressed.  Neck: " thyroid normal size without nodules, no palpable adenopathy  Breasts: no masses, nodes, skin changes  Abdomen: soft, non tender, non tender liver edge

## 2025-08-12 ENCOUNTER — APPOINTMENT (OUTPATIENT)
Age: 85
End: 2025-08-12
Payer: MEDICARE